# Patient Record
Sex: MALE | Race: BLACK OR AFRICAN AMERICAN | Employment: OTHER | URBAN - METROPOLITAN AREA
[De-identification: names, ages, dates, MRNs, and addresses within clinical notes are randomized per-mention and may not be internally consistent; named-entity substitution may affect disease eponyms.]

---

## 2017-05-03 PROBLEM — Z97.8 FOLEY CATHETER STATUS: Status: ACTIVE | Noted: 2017-05-03

## 2017-05-03 PROBLEM — R33.9 INCOMPLETE BLADDER EMPTYING: Status: ACTIVE | Noted: 2017-05-03

## 2017-05-03 PROBLEM — Z87.440 HISTORY OF UTI: Status: ACTIVE | Noted: 2017-05-03

## 2017-05-03 PROBLEM — N39.0 URINARY TRACT INFECTION WITHOUT HEMATURIA: Status: ACTIVE | Noted: 2017-05-03

## 2017-05-03 PROBLEM — R31.29 MICROSCOPIC HEMATURIA: Status: ACTIVE | Noted: 2017-05-03

## 2017-05-03 PROBLEM — N40.1 ENLARGED PROSTATE WITH LOWER URINARY TRACT SYMPTOMS (LUTS): Status: ACTIVE | Noted: 2017-05-03

## 2017-05-03 PROBLEM — R33.9 URINARY RETENTION: Status: ACTIVE | Noted: 2017-05-03

## 2017-05-03 PROBLEM — N13.30 HYDRONEPHROSIS OF RIGHT KIDNEY: Status: ACTIVE | Noted: 2017-05-03

## 2017-07-13 ENCOUNTER — APPOINTMENT (OUTPATIENT)
Dept: ULTRASOUND IMAGING | Age: 77
End: 2017-07-13
Attending: EMERGENCY MEDICINE
Payer: MEDICARE

## 2017-07-13 ENCOUNTER — HOSPITAL ENCOUNTER (EMERGENCY)
Age: 77
Discharge: HOME | End: 2017-07-13
Attending: EMERGENCY MEDICINE | Admitting: EMERGENCY MEDICINE
Payer: MEDICARE

## 2017-07-13 VITALS
HEART RATE: 81 BPM | BODY MASS INDEX: 31.07 KG/M2 | RESPIRATION RATE: 14 BRPM | OXYGEN SATURATION: 94 % | TEMPERATURE: 98.5 F | WEIGHT: 186.73 LBS | DIASTOLIC BLOOD PRESSURE: 69 MMHG | SYSTOLIC BLOOD PRESSURE: 113 MMHG

## 2017-07-13 DIAGNOSIS — N45.3 EPIDIDYMO-ORCHITIS: Primary | ICD-10-CM

## 2017-07-13 LAB
APPEARANCE UR: ABNORMAL
BACTERIA URNS QL MICRO: ABNORMAL /HPF
BILIRUB UR QL: NEGATIVE
COLOR UR: ABNORMAL
EPITH CASTS URNS QL MICRO: ABNORMAL /LPF
GLUCOSE UR STRIP.AUTO-MCNC: NEGATIVE MG/DL
HGB UR QL STRIP: ABNORMAL
KETONES UR QL STRIP.AUTO: NEGATIVE MG/DL
LEUKOCYTE ESTERASE UR QL STRIP.AUTO: ABNORMAL
NITRITE UR QL STRIP.AUTO: NEGATIVE
PH UR STRIP: 5.5 [PH] (ref 5–8)
PROT UR STRIP-MCNC: 30 MG/DL
RBC #/AREA URNS HPF: ABNORMAL /HPF (ref 0–5)
SP GR UR REFRACTOMETRY: 1.03 (ref 1–1.03)
UROBILINOGEN UR QL STRIP.AUTO: 0.2 EU/DL (ref 0.2–1)
WBC URNS QL MICRO: ABNORMAL /HPF (ref 0–4)

## 2017-07-13 PROCEDURE — 76870 US EXAM SCROTUM: CPT

## 2017-07-13 PROCEDURE — 74011250637 HC RX REV CODE- 250/637: Performed by: EMERGENCY MEDICINE

## 2017-07-13 PROCEDURE — 99283 EMERGENCY DEPT VISIT LOW MDM: CPT

## 2017-07-13 PROCEDURE — 81001 URINALYSIS AUTO W/SCOPE: CPT | Performed by: EMERGENCY MEDICINE

## 2017-07-13 RX ORDER — TRAMADOL HYDROCHLORIDE 50 MG/1
50 TABLET ORAL
Status: COMPLETED | OUTPATIENT
Start: 2017-07-13 | End: 2017-07-13

## 2017-07-13 RX ORDER — LEVOFLOXACIN 500 MG/1
500 TABLET, FILM COATED ORAL DAILY
Qty: 10 TAB | Refills: 0 | Status: SHIPPED | OUTPATIENT
Start: 2017-07-13 | End: 2017-07-18

## 2017-07-13 RX ORDER — HYDROCODONE BITARTRATE AND ACETAMINOPHEN 7.5; 325 MG/1; MG/1
1 TABLET ORAL
Status: COMPLETED | OUTPATIENT
Start: 2017-07-13 | End: 2017-07-13

## 2017-07-13 RX ORDER — HYDROCODONE BITARTRATE AND ACETAMINOPHEN 5; 325 MG/1; MG/1
1 TABLET ORAL
Qty: 20 TAB | Refills: 0 | Status: SHIPPED | OUTPATIENT
Start: 2017-07-13 | End: 2017-09-05

## 2017-07-13 RX ADMIN — HYDROCODONE BITARTRATE AND ACETAMINOPHEN 1 TABLET: 7.5; 325 TABLET ORAL at 17:54

## 2017-07-13 RX ADMIN — TRAMADOL HYDROCHLORIDE 50 MG: 50 TABLET, FILM COATED ORAL at 12:57

## 2017-07-13 NOTE — DISCHARGE INSTRUCTIONS
Epididymitis and Orchitis: Care Instructions  Your Care Instructions    Epididymitis is pain and swelling of the tube that is attached to each testicle. This tube is called the epididymis. Orchitis is pain and swelling of the testicle. Infection with bacteria often causes these conditions. Sexually transmitted infections (STIs) also can cause both conditions. This is often the case in men younger than 28. Other causes in boys and older men are infections from surgery or having a catheter that drains urine. The mumps virus also can cause orchitis. Anti-inflammatory or pain medicines can help with the pain. Antibiotics are used if the problem is caused by bacteria. They are not used if a virus is the cause. Your testicle may stay swollen for many days or even a few weeks. The doctor has checked you carefully, but problems can develop later. If you notice any problems or new symptoms, get medical treatment right away. Follow-up care is a key part of your treatment and safety. Be sure to make and go to all appointments, and call your doctor if you are having problems. It's also a good idea to know your test results and keep a list of the medicines you take. How can you care for yourself at home? · If your doctor prescribed antibiotics, take them as directed. Do not stop taking them just because you feel better. You need to take the full course of antibiotics. · Ask your doctor if you can take an over-the-counter pain medicine, such as acetaminophen (Tylenol), ibuprofen (Advil, Motrin), or naproxen (Aleve). Be safe with medicines. Read and follow all instructions on the label. · Limit your activity to what is comfortable. · Wear snug underwear or an athletic supporter. This can help reduce pain. · Apply either cold or heat to the swollen area. Use the one that works best for your pain. Sitting in a warm bath for 15 minutes twice a day will help reduce the swelling more quickly.   · If you have been told that an STI may have caused your condition, do not have sex until your doctor says it is safe. This will prevent spreading the infection. Tell your sex partner or partners that they need to be checked. They may need treatment. When should you call for help? Call your doctor now or seek immediate medical care if:  · Your pain gets worse. · You have a new or higher fever. · You have new or more swelling of your testicle. · You have new belly pain, or your pain gets worse. Watch closely for changes in your health, and be sure to contact your doctor if:  · You do not get better as expected. Where can you learn more? Go to http://bernarda-pereti.info/. Enter S360 in the search box to learn more about \"Epididymitis and Orchitis: Care Instructions. \"  Current as of: August 12, 2016  Content Version: 11.3  © 9201-1572 [employer]. Care instructions adapted under license by [employer] (which disclaims liability or warranty for this information). If you have questions about a medical condition or this instruction, always ask your healthcare professional. [address] any warranty or liability for your use of this information.

## 2017-07-13 NOTE — ED NOTES
Discharge paper work given to patient by MD patients questions and concerns answered. Patient discharged. Patient given scrotal sling.

## 2017-07-13 NOTE — ED NOTES
Bedside shift change report given to [place] (oncoming nurse) by Jeffrey Levi  (offgoing nurse). Report included the following information SBAR and ED Summary.

## 2017-07-13 NOTE — ED PROVIDER NOTES
HPI Comments: Willard Holm is a 78 y.o. male with pertinent PMHx of enlarged prostate who presents ambulatory to ED c/o constant, worsening, moderate swelling and pain of testicles for the past 2 days. Pt states he has been taking Extra Strength Tylenol with temporary relief of pain. Pt notes he has been evaluated by urology for enlarged prostate in the past. Pt denies any fevers, chills, difficulty urinating, penile discharge, or N/V/D.     PCP:  JAVY Chu    Social Hx:  tobacco use (+), EtOH use (-)    There are no other complaints, changes or physical findings at this time. The history is provided by the patient. History reviewed. No pertinent past medical history. History reviewed. No pertinent surgical history. History reviewed. No pertinent family history. Social History     Social History    Marital status:      Spouse name: N/A    Number of children: N/A    Years of education: N/A     Occupational History    Not on file. Social History Main Topics    Smoking status: Heavy Tobacco Smoker     Packs/day: 3.00    Smokeless tobacco: Never Used      Comment: urocancer risk discussed    Alcohol use No    Drug use: Not on file    Sexual activity: Not on file     Other Topics Concern    Not on file     Social History Narrative         ALLERGIES: Review of patient's allergies indicates no known allergies. Review of Systems   Constitutional: Negative. Negative for activity change, appetite change, chills, fatigue, fever and unexpected weight change. HENT: Negative. Negative for congestion, hearing loss, rhinorrhea, sneezing and voice change. Eyes: Negative. Negative for pain and visual disturbance. Respiratory: Negative. Negative for apnea, cough, choking, chest tightness and shortness of breath. Cardiovascular: Negative. Negative for chest pain and palpitations. Gastrointestinal: Negative.   Negative for abdominal distention, abdominal pain, blood in stool, diarrhea, nausea and vomiting. Genitourinary: Positive for scrotal swelling and testicular pain. Negative for difficulty urinating, discharge, flank pain, frequency and urgency. No discharge   Musculoskeletal: Negative. Negative for arthralgias, back pain, myalgias and neck stiffness. Skin: Negative. Negative for color change and rash. Neurological: Negative. Negative for dizziness, seizures, syncope, speech difficulty, weakness, numbness and headaches. Hematological: Negative for adenopathy. Psychiatric/Behavioral: Negative. Negative for agitation, behavioral problems, dysphoric mood and suicidal ideas. The patient is not nervous/anxious. Vitals:    07/13/17 1232 07/13/17 1400 07/13/17 1415   BP: 111/56 118/62 113/69   Pulse: 81     Resp: 14     Temp: 98.5 °F (36.9 °C)     SpO2: 94% 93% 94%   Weight: 84.7 kg (186 lb 11.7 oz)              Physical Exam   Constitutional: He is oriented to person, place, and time. He appears well-developed and well-nourished. No distress. HENT:   Head: Normocephalic and atraumatic. Mouth/Throat: Oropharynx is clear and moist. No oropharyngeal exudate. Eyes: Conjunctivae and EOM are normal. Pupils are equal, round, and reactive to light. Right eye exhibits no discharge. Left eye exhibits no discharge. Neck: Normal range of motion. Neck supple. Cardiovascular: Normal rate, regular rhythm and intact distal pulses. Exam reveals no gallop and no friction rub. No murmur heard. Pulmonary/Chest: Effort normal and breath sounds normal. No respiratory distress. He has no wheezes. He has no rales. He exhibits no tenderness. Abdominal: Soft. Bowel sounds are normal. He exhibits no distension and no mass. There is no tenderness. There is no rebound and no guarding. Genitourinary: Right testis shows swelling and tenderness. Left testis shows swelling and tenderness. Genitourinary Comments: Enlarged testicles with diffuse swelling. Musculoskeletal: Normal range of motion. He exhibits no edema. Lymphadenopathy:     He has no cervical adenopathy. Neurological: He is alert and oriented to person, place, and time. No cranial nerve deficit. Coordination normal.   Skin: Skin is warm and dry. No rash noted. No erythema. Psychiatric: He has a normal mood and affect. Nursing note and vitals reviewed. MDM  Number of Diagnoses or Management Options  Diagnosis management comments: DDx: Torsion, Hydrocele, orchitis        Amount and/or Complexity of Data Reviewed  Tests in the radiology section of CPT®: reviewed and ordered  Review and summarize past medical records: yes    Patient Progress  Patient progress: stable    ED Course       Procedures    Chief Complaint   Patient presents with    Testicle Pain     bilateral testicle pain and swelling x 2 days. 12:56 PM  The patients presenting problems have been discussed, and they are in agreement with the care plan formulated and outlined with them. I have encouraged them to ask questions as they arise throughout their visit. MEDICATIONS GIVEN:  Medications   HYDROcodone-acetaminophen (NORCO) 7.5-325 mg per tablet 1 Tab (not administered)   traMADol (ULTRAM) tablet 50 mg (50 mg Oral Given 7/13/17 1257)       LABS REVIEWED:  No results found for this or any previous visit (from the past 24 hour(s)). VITAL SIGNS:  Patient Vitals for the past 12 hrs:   Temp Pulse Resp BP SpO2   07/13/17 1415 - - - 113/69 94 %   07/13/17 1400 - - - 118/62 93 %   07/13/17 1232 98.5 °F (36.9 °C) 81 14 111/56 94 %       RADIOLOGY RESULTS:  The following have been ordered and reviewed:    EXAM:  US SCROTUM/TESTICLES      INDICATION: Pain and swelling for 3 days.     COMPARISON: None.     TECHNIQUE:  Real-time sonography of the scrotum was performed with a high frequency linear  transducer. Multiple static images were obtained.  Color Doppler evaluation was  also performed.     FINDINGS:  RIGHT TESTICLE: The right testicle is normal in size and echotexture with normal  color-flow. The right testis measures 3.8 x 2.8 x 2.2 cm and the right  testicular volume is 12.1 cc.      RIGHT EPIDIDYMIS: There is a small epididymal head cyst.      LEFT TESTICLE: The left testicle is normal in size and echotexture with  significantly increased flow. The left testis measures 3.7 x 3.0 x 2.8 cm and  the left testicular volume is 16.2 cc. There is a complex left hydrocele.     LEFT EPIDIDYMIS: There is hypervascularity of the epididymis.     There is significant thickening of the scrotum bilaterally. There is thickening  and inflammation of the left spermatic cord.     IMPRESSION  IMPRESSION:   1. Left epididymoorchitis with a thickened inflamed left spermatic cord and  complex left hydrocele. 2. Significant bilateral scrotal edema. PROGRESS NOTES:    4:18 PM  The patient states that their symptoms have resolved and they feel much better. There are no other new complaints at this time. His questions have been answered. We are awaiting final results and those will be reviewed with them when they become available. 5:46 PM   Patient resting in [facility]. Updated on results of [address]. Discussed discharge instructions including f/u instructions and return precautions. Addressed questions. Pt's US resulted with epididymo-orchitis. Patient denies sexual activity x years. Will tx with Levofloxacin and f/u with Dr. Wendy Shook, urology. DIAGNOSIS:    1.  Epididymo-orchitis        PLAN:  Follow-up Information     Follow up With Details Comments Contact Info    Yadi Rivera MD  Please follow up as soon as possible [place] [address]  [phone]      [facility]  As needed, If symptoms worsen [address] [place] [address]  [phone]        Current Discharge Medication List      START taking these medications    Details   levoFLOXacin (LEVAQUIN) 500 mg tablet Take 1 Tab by mouth daily for 10 days. Qty: 10 Tab, Refills: 0         CONTINUE these medications which have CHANGED    Details   HYDROcodone-acetaminophen (NORCO) 5-325 mg per tablet Take 1 Tab by mouth every six (6) hours as needed for Pain. Max Daily Amount: 4 Tabs. Qty: 20 Tab, Refills: 0               ED COURSE: The patients hospital course has been uncomplicated. SIGN OUT:  4:48 PM  Patient's presentation, labs/imaging and plan of care was reviewed with Patricia Mcfadden MD as part of sign out. They will review ultrasound results and disposition as part of the plan discussed with the patient. Patricia Mcfadden MD's assistance in completion of this plan is greatly appreciated but it should be noted that I will be the provider of record for this patient. David Torres MD    Attestations: This note is prepared by [employer], acting as Scribe for Jerry Cabezas. Ryne Torres MD.      The scribe's documentation has been prepared under my direction and personally reviewed by me in its entirety. I confirm that the note above accurately reflects all work, treatment, procedures, and medical decision making performed by me. David Torres MD

## 2017-07-13 NOTE — ED NOTES
Patient ambulatory to the ER c/o bilateral testicle swelling and pain x 2 days. Hx of enlarged prostate. Patient states this happened after lifting.

## 2017-07-16 PROBLEM — N39.0 RECURRENT UTI: Status: ACTIVE | Noted: 2017-07-16

## 2017-07-16 PROBLEM — N45.1 EPIDIDYMITIS, LEFT: Status: ACTIVE | Noted: 2017-07-16

## 2017-07-16 PROBLEM — N43.3 HYDROCELE OF SPERMATIC CORD, TESTIS, OR TUNICA VAGINALIS: Status: ACTIVE | Noted: 2017-07-16

## 2017-07-16 PROBLEM — N39.0 URINARY TRACT INFECTION WITHOUT HEMATURIA: Status: RESOLVED | Noted: 2017-05-03 | Resolved: 2017-07-16

## 2017-07-16 PROBLEM — Z97.8 FOLEY CATHETER STATUS: Status: RESOLVED | Noted: 2017-05-03 | Resolved: 2017-07-16

## 2017-08-15 PROBLEM — R39.15 URGENCY OF URINATION: Status: ACTIVE | Noted: 2017-08-15

## 2017-08-15 PROBLEM — N39.0 RECURRENT UTI: Status: RESOLVED | Noted: 2017-07-16 | Resolved: 2017-08-15

## 2017-08-15 PROBLEM — R33.9 URINARY RETENTION: Status: RESOLVED | Noted: 2017-05-03 | Resolved: 2017-08-15

## 2017-08-15 PROBLEM — Z87.898 HISTORY OF URINARY RETENTION: Status: ACTIVE | Noted: 2017-08-15

## 2017-08-15 PROBLEM — R35.1 NOCTURIA: Status: ACTIVE | Noted: 2017-08-15

## 2019-10-28 PROBLEM — N39.0 URINARY TRACT INFECTION WITHOUT HEMATURIA: Status: ACTIVE | Noted: 2019-10-28

## 2019-10-28 PROBLEM — N17.9 ACUTE RENAL FAILURE (HCC): Status: ACTIVE | Noted: 2019-10-28

## 2019-10-28 PROBLEM — R31.29 MICROSCOPIC HEMATURIA: Status: RESOLVED | Noted: 2017-05-03 | Resolved: 2019-10-28

## 2019-10-28 PROBLEM — R33.9 URINE RETENTION: Status: ACTIVE | Noted: 2019-10-28

## 2019-10-28 PROBLEM — Z87.898 HISTORY OF URINARY RETENTION: Status: RESOLVED | Noted: 2017-08-15 | Resolved: 2019-10-28

## 2019-10-28 PROBLEM — Z87.440 HISTORY OF UTI: Status: RESOLVED | Noted: 2017-05-03 | Resolved: 2019-10-28

## 2019-10-28 PROBLEM — N43.3 HYDROCELE OF SPERMATIC CORD, TESTIS, OR TUNICA VAGINALIS: Status: RESOLVED | Noted: 2017-07-16 | Resolved: 2019-10-28

## 2019-10-28 PROBLEM — Z97.8 FOLEY CATHETER IN PLACE: Status: ACTIVE | Noted: 2019-10-28

## 2019-10-28 PROBLEM — N45.1 EPIDIDYMITIS, LEFT: Status: RESOLVED | Noted: 2017-07-16 | Resolved: 2019-10-28

## 2019-10-31 PROBLEM — M25.519 ARTHRALGIA OF SHOULDER: Status: ACTIVE | Noted: 2019-10-31

## 2019-10-31 PROBLEM — E78.5 HYPERLIPIDEMIA: Status: ACTIVE | Noted: 2019-10-31

## 2019-10-31 PROBLEM — R73.01 IMPAIRED FASTING GLUCOSE: Status: ACTIVE | Noted: 2019-10-31

## 2019-10-31 PROBLEM — R20.2 PARESTHESIA OF HAND: Status: ACTIVE | Noted: 2019-10-31

## 2019-10-31 PROBLEM — N32.89 BLADDER SPASM: Status: ACTIVE | Noted: 2017-05-13

## 2019-10-31 PROBLEM — K63.5 COLON POLYPS: Status: ACTIVE | Noted: 2017-05-13

## 2019-10-31 PROBLEM — N40.1 LOWER URINARY TRACT SYMPTOMS DUE TO BENIGN PROSTATIC HYPERPLASIA: Status: ACTIVE | Noted: 2019-10-31

## 2019-10-31 PROBLEM — N19 RENAL FAILURE: Status: ACTIVE | Noted: 2019-10-15

## 2019-10-31 PROBLEM — H91.90 HEARING LOSS: Status: ACTIVE | Noted: 2019-10-31

## 2019-10-31 PROBLEM — F43.10 POSTTRAUMATIC STRESS DISORDER, DELAYED ONSET: Status: ACTIVE | Noted: 2019-10-31

## 2019-10-31 PROBLEM — E55.9 VITAMIN D DEFICIENCY: Status: ACTIVE | Noted: 2019-10-31

## 2019-10-31 PROBLEM — E11.8 TYPE 2 DIABETES MELLITUS WITH COMPLICATION (HCC): Status: ACTIVE | Noted: 2018-08-30

## 2019-10-31 PROBLEM — B35.4 TINEA CORPORIS: Status: ACTIVE | Noted: 2018-08-30

## 2019-10-31 PROBLEM — M54.50 CHRONIC LOW BACK PAIN: Status: ACTIVE | Noted: 2019-10-31

## 2019-10-31 PROBLEM — Z72.0 TOBACCO USER: Status: ACTIVE | Noted: 2017-05-13

## 2019-10-31 PROBLEM — Z91.199 NONCOMPLIANCE: Status: ACTIVE | Noted: 2017-05-13

## 2019-10-31 PROBLEM — M54.16 LUMBAR RADICULOPATHY: Status: ACTIVE | Noted: 2019-10-31

## 2019-10-31 PROBLEM — G89.29 CHRONIC LOW BACK PAIN: Status: ACTIVE | Noted: 2019-10-31

## 2019-10-31 PROBLEM — N40.0 BENIGN PROSTATIC HYPERPLASIA: Status: ACTIVE | Noted: 2017-05-03

## 2019-10-31 PROBLEM — H02.401 PTOSIS OF RIGHT EYELID: Status: ACTIVE | Noted: 2018-10-24

## 2019-10-31 PROBLEM — E66.9 OBESITY: Status: ACTIVE | Noted: 2019-10-31

## 2019-10-31 PROBLEM — M25.552 PAIN OF LEFT HIP JOINT: Status: ACTIVE | Noted: 2019-10-31

## 2019-12-15 PROBLEM — N19 RENAL FAILURE: Status: RESOLVED | Noted: 2019-10-15 | Resolved: 2019-12-15

## 2019-12-15 PROBLEM — N40.1 LOWER URINARY TRACT SYMPTOMS DUE TO BENIGN PROSTATIC HYPERPLASIA: Status: RESOLVED | Noted: 2019-10-31 | Resolved: 2019-12-15

## 2019-12-15 PROBLEM — N32.89 BLADDER SPASM: Status: RESOLVED | Noted: 2017-05-13 | Resolved: 2019-12-15

## 2020-03-09 NOTE — PERIOP NOTES
PAT - SURGICAL PRE-ADMISSION INSTRUCTIONS    NAME:  Brandon Taveras                                                          TODAY'S DATE:  3/9/2020    SURGERY DATE:  3/12/2020                                  SURGERY ARRIVAL TIME:   TBA    1. Do NOT eat or drink anything, including candy or gum, after MIDNIGHT on 03/11 , unless you have specific instructions from your Surgeon or Anesthesia Provider to do so. 2. No smoking on the day of surgery. 3. No alcohol 24 hours prior to the day of surgery. 4. No recreational drugs for one week prior to the day of surgery. 5. Leave all valuables, including money/purse, at home. 6. Remove all jewelry, nail polish, makeup (including mascara); no lotions, powders, deodorant, or perfume/cologne/after shave. 7. Glasses/Contact lenses and Dentures may be worn to the hospital.  They will be removed prior to surgery. 8. Call your doctor if symptoms of a cold or illness develop within 24 ours prior to surgery. 9. AN ADULT MUST DRIVE YOU HOME AFTER OUTPATIENT SURGERY. 10. If you are having an OUTPATIENT procedure, please make arrangements for a responsible adult to be with you for 24 hours after your surgery. 11. If you are admitted to the hospital, you will be assigned to a bed after surgery is complete. Normally a family member will not be able to see you until you are in your assigned bed. 15. Family is encouraged to accompany you to the hospital.  We ask visitors in the treatment area to be limited to ONE person at a time to ensure patient privacy. EXCEPTIONS WILL BE MADE AS NEEDED. 15. Children under 12 are discouraged from entering the treatment area and need to be supervised by an adult when in the waiting room. Special Instructions:    NONE. Patient Prep:    shower with anti-bacterial soap    These surgical instructions were reviewed with Johnny during the PAT phone call. Directions: On the morning of surgery, please go to the [facility]. Enter the building from the [facility] entrance, 1st floor (next to the Emergency Room entrance). Take the elevator to the 2nd floor. Sign in at the Registration Desk.     If you have any questions and/or concerns, please do not hesitate to call:  (Prior to the day of surgery)  [facility] unit:  [phone]  (Day of surgery)  [facility] unit:  [phone]

## 2020-03-11 ENCOUNTER — ANESTHESIA EVENT (OUTPATIENT)
Dept: SURGERY | Age: 80
End: 2020-03-11
Payer: MEDICARE

## 2020-03-12 ENCOUNTER — HOSPITAL ENCOUNTER (OUTPATIENT)
Age: 80
Setting detail: OUTPATIENT SURGERY
Discharge: HOME | End: 2020-03-12
Attending: UROLOGY | Admitting: UROLOGY
Payer: MEDICARE

## 2020-03-12 ENCOUNTER — ANESTHESIA (OUTPATIENT)
Dept: SURGERY | Age: 80
End: 2020-03-12
Payer: MEDICARE

## 2020-03-12 VITALS
DIASTOLIC BLOOD PRESSURE: 76 MMHG | SYSTOLIC BLOOD PRESSURE: 154 MMHG | RESPIRATION RATE: 17 BRPM | TEMPERATURE: 98 F | WEIGHT: 177.2 LBS | HEIGHT: 65 IN | BODY MASS INDEX: 29.52 KG/M2 | OXYGEN SATURATION: 95 % | HEART RATE: 75 BPM

## 2020-03-12 DIAGNOSIS — N40.1 BENIGN PROSTATIC HYPERPLASIA WITH LOWER URINARY TRACT SYMPTOMS, SYMPTOM DETAILS UNSPECIFIED: Primary | ICD-10-CM

## 2020-03-12 PROCEDURE — 74011250636 HC RX REV CODE- 250/636: Performed by: NURSE ANESTHETIST, CERTIFIED REGISTERED

## 2020-03-12 PROCEDURE — 74011250637 HC RX REV CODE- 250/637: Performed by: NURSE ANESTHETIST, CERTIFIED REGISTERED

## 2020-03-12 PROCEDURE — 74011000258 HC RX REV CODE- 258: Performed by: UROLOGY

## 2020-03-12 PROCEDURE — 77030020849 HC CATH URETH FOL 3 WAY  BARD -B: Performed by: UROLOGY

## 2020-03-12 PROCEDURE — 74011000250 HC RX REV CODE- 250: Performed by: NURSE ANESTHETIST, CERTIFIED REGISTERED

## 2020-03-12 PROCEDURE — 74011000250 HC RX REV CODE- 250: Performed by: UROLOGY

## 2020-03-12 PROCEDURE — 76060000036 HC ANESTHESIA 2.5 TO 3 HR: Performed by: UROLOGY

## 2020-03-12 PROCEDURE — 74011250636 HC RX REV CODE- 250/636: Performed by: UROLOGY

## 2020-03-12 PROCEDURE — 77030012961 HC IRR KT CYSTO/TUR ICUM -A: Performed by: UROLOGY

## 2020-03-12 PROCEDURE — 77030005546 HC CATH URETH FOL 3W BARD -A: Performed by: UROLOGY

## 2020-03-12 PROCEDURE — 77030012884 HC SLV COMPR SCD MTEK -B: Performed by: UROLOGY

## 2020-03-12 PROCEDURE — 74011000258 HC RX REV CODE- 258: Performed by: NURSE ANESTHETIST, CERTIFIED REGISTERED

## 2020-03-12 PROCEDURE — 76210000022 HC REC RM PH II 1.5 TO 2 HR: Performed by: UROLOGY

## 2020-03-12 PROCEDURE — 76210000019 HC OR PH I REC 4 TO 4.5 HR: Performed by: UROLOGY

## 2020-03-12 PROCEDURE — 74011000272 HC RX REV CODE- 272: Performed by: UROLOGY

## 2020-03-12 PROCEDURE — 77030018846 HC SOL IRR STRL H20 ICUM -A: Performed by: UROLOGY

## 2020-03-12 PROCEDURE — 88305 TISSUE EXAM BY PATHOLOGIST: CPT

## 2020-03-12 PROCEDURE — 74011000250 HC RX REV CODE- 250: Performed by: ANESTHESIOLOGY

## 2020-03-12 PROCEDURE — 77030015791 HC CATH FOL DRN LXF BARD -A: Performed by: UROLOGY

## 2020-03-12 PROCEDURE — 76010000172 HC OR TIME 2.5 TO 3 HR INTENSV-TIER 1: Performed by: UROLOGY

## 2020-03-12 PROCEDURE — 77030018836 HC SOL IRR NACL ICUM -A: Performed by: UROLOGY

## 2020-03-12 RX ORDER — LIDOCAINE HYDROCHLORIDE 10 MG/ML
0.1 INJECTION, SOLUTION EPIDURAL; INFILTRATION; INTRACAUDAL; PERINEURAL AS NEEDED
Status: DISCONTINUED | OUTPATIENT
Start: 2020-03-12 | End: 2020-03-12 | Stop reason: HOSPADM

## 2020-03-12 RX ORDER — FAMOTIDINE 20 MG/1
20 TABLET, FILM COATED ORAL ONCE
Status: COMPLETED | OUTPATIENT
Start: 2020-03-12 | End: 2020-03-12

## 2020-03-12 RX ORDER — IPRATROPIUM BROMIDE AND ALBUTEROL SULFATE 2.5; .5 MG/3ML; MG/3ML
3 SOLUTION RESPIRATORY (INHALATION)
Status: COMPLETED | OUTPATIENT
Start: 2020-03-12 | End: 2020-03-12

## 2020-03-12 RX ORDER — LIDOCAINE HYDROCHLORIDE 20 MG/ML
INJECTION, SOLUTION EPIDURAL; INFILTRATION; INTRACAUDAL; PERINEURAL AS NEEDED
Status: DISCONTINUED | OUTPATIENT
Start: 2020-03-12 | End: 2020-03-12 | Stop reason: HOSPADM

## 2020-03-12 RX ORDER — EPHEDRINE SULFATE/0.9% NACL/PF 50 MG/5 ML
SYRINGE (ML) INTRAVENOUS AS NEEDED
Status: DISCONTINUED | OUTPATIENT
Start: 2020-03-12 | End: 2020-03-12 | Stop reason: HOSPADM

## 2020-03-12 RX ORDER — ONDANSETRON 2 MG/ML
4 INJECTION INTRAMUSCULAR; INTRAVENOUS ONCE
Status: DISCONTINUED | OUTPATIENT
Start: 2020-03-12 | End: 2020-03-12 | Stop reason: HOSPADM

## 2020-03-12 RX ORDER — PROPOFOL 10 MG/ML
INJECTION, EMULSION INTRAVENOUS AS NEEDED
Status: DISCONTINUED | OUTPATIENT
Start: 2020-03-12 | End: 2020-03-12 | Stop reason: HOSPADM

## 2020-03-12 RX ORDER — ROCURONIUM BROMIDE 10 MG/ML
INJECTION, SOLUTION INTRAVENOUS AS NEEDED
Status: DISCONTINUED | OUTPATIENT
Start: 2020-03-12 | End: 2020-03-12 | Stop reason: HOSPADM

## 2020-03-12 RX ORDER — MAGNESIUM SULFATE 100 %
4 CRYSTALS MISCELLANEOUS AS NEEDED
Status: DISCONTINUED | OUTPATIENT
Start: 2020-03-12 | End: 2020-03-12 | Stop reason: HOSPADM

## 2020-03-12 RX ORDER — FENTANYL CITRATE 50 UG/ML
INJECTION, SOLUTION INTRAMUSCULAR; INTRAVENOUS AS NEEDED
Status: DISCONTINUED | OUTPATIENT
Start: 2020-03-12 | End: 2020-03-12 | Stop reason: HOSPADM

## 2020-03-12 RX ORDER — DEXAMETHASONE SODIUM PHOSPHATE 4 MG/ML
INJECTION, SOLUTION INTRA-ARTICULAR; INTRALESIONAL; INTRAMUSCULAR; INTRAVENOUS; SOFT TISSUE AS NEEDED
Status: DISCONTINUED | OUTPATIENT
Start: 2020-03-12 | End: 2020-03-12 | Stop reason: HOSPADM

## 2020-03-12 RX ORDER — FENTANYL CITRATE 50 UG/ML
50 INJECTION, SOLUTION INTRAMUSCULAR; INTRAVENOUS AS NEEDED
Status: DISCONTINUED | OUTPATIENT
Start: 2020-03-12 | End: 2020-03-12 | Stop reason: HOSPADM

## 2020-03-12 RX ORDER — ONDANSETRON 2 MG/ML
INJECTION INTRAMUSCULAR; INTRAVENOUS AS NEEDED
Status: DISCONTINUED | OUTPATIENT
Start: 2020-03-12 | End: 2020-03-12 | Stop reason: HOSPADM

## 2020-03-12 RX ORDER — DOCUSATE SODIUM 100 MG/1
100 CAPSULE, LIQUID FILLED ORAL
Qty: 30 CAP | Refills: 0 | Status: SHIPPED | OUTPATIENT
Start: 2020-03-12 | End: 2020-04-11

## 2020-03-12 RX ORDER — TRAMADOL HYDROCHLORIDE 50 MG/1
50 TABLET ORAL
Qty: 12 TAB | Refills: 0 | Status: SHIPPED | OUTPATIENT
Start: 2020-03-12 | End: 2020-03-15

## 2020-03-12 RX ORDER — SODIUM CHLORIDE 0.9 % (FLUSH) 0.9 %
5-40 SYRINGE (ML) INJECTION AS NEEDED
Status: DISCONTINUED | OUTPATIENT
Start: 2020-03-12 | End: 2020-03-12 | Stop reason: HOSPADM

## 2020-03-12 RX ORDER — HYDROMORPHONE HYDROCHLORIDE 2 MG/ML
0.5 INJECTION, SOLUTION INTRAMUSCULAR; INTRAVENOUS; SUBCUTANEOUS
Status: DISCONTINUED | OUTPATIENT
Start: 2020-03-12 | End: 2020-03-12 | Stop reason: HOSPADM

## 2020-03-12 RX ORDER — DEXTROSE MONOHYDRATE 100 MG/ML
125-250 INJECTION, SOLUTION INTRAVENOUS AS NEEDED
Status: DISCONTINUED | OUTPATIENT
Start: 2020-03-12 | End: 2020-03-12 | Stop reason: HOSPADM

## 2020-03-12 RX ORDER — SODIUM CHLORIDE 0.9 % (FLUSH) 0.9 %
5-40 SYRINGE (ML) INJECTION EVERY 8 HOURS
Status: DISCONTINUED | OUTPATIENT
Start: 2020-03-12 | End: 2020-03-12 | Stop reason: HOSPADM

## 2020-03-12 RX ORDER — SODIUM CHLORIDE, SODIUM LACTATE, POTASSIUM CHLORIDE, CALCIUM CHLORIDE 600; 310; 30; 20 MG/100ML; MG/100ML; MG/100ML; MG/100ML
50 INJECTION, SOLUTION INTRAVENOUS CONTINUOUS
Status: DISCONTINUED | OUTPATIENT
Start: 2020-03-12 | End: 2020-03-12 | Stop reason: HOSPADM

## 2020-03-12 RX ORDER — SUCCINYLCHOLINE CHLORIDE 100 MG/5ML
SYRINGE (ML) INTRAVENOUS AS NEEDED
Status: DISCONTINUED | OUTPATIENT
Start: 2020-03-12 | End: 2020-03-12 | Stop reason: HOSPADM

## 2020-03-12 RX ADMIN — FENTANYL CITRATE 50 MCG: 50 INJECTION, SOLUTION INTRAMUSCULAR; INTRAVENOUS at 09:16

## 2020-03-12 RX ADMIN — IPRATROPIUM BROMIDE AND ALBUTEROL SULFATE 3 ML: .5; 3 SOLUTION RESPIRATORY (INHALATION) at 08:08

## 2020-03-12 RX ADMIN — SUGAMMADEX 200 MG: 100 INJECTION, SOLUTION INTRAVENOUS at 11:17

## 2020-03-12 RX ADMIN — FENTANYL CITRATE 50 MCG: 50 INJECTION, SOLUTION INTRAMUSCULAR; INTRAVENOUS at 08:44

## 2020-03-12 RX ADMIN — ONDANSETRON 4 MG: 2 SOLUTION INTRAMUSCULAR; INTRAVENOUS at 11:17

## 2020-03-12 RX ADMIN — FAMOTIDINE 20 MG: 20 TABLET ORAL at 07:36

## 2020-03-12 RX ADMIN — ROCURONIUM BROMIDE 35 MG: 10 SOLUTION INTRAVENOUS at 09:04

## 2020-03-12 RX ADMIN — Medication 100 MG: at 08:53

## 2020-03-12 RX ADMIN — PHENYLEPHRINE HYDROCHLORIDE 100 MCG: 10 INJECTION INTRAVENOUS at 09:06

## 2020-03-12 RX ADMIN — PHENYLEPHRINE HYDROCHLORIDE 100 MCG: 10 INJECTION INTRAVENOUS at 09:22

## 2020-03-12 RX ADMIN — SODIUM CHLORIDE, SODIUM LACTATE, POTASSIUM CHLORIDE, AND CALCIUM CHLORIDE 50 ML/HR: 600; 310; 30; 20 INJECTION, SOLUTION INTRAVENOUS at 07:43

## 2020-03-12 RX ADMIN — ROCURONIUM BROMIDE 10 MG: 10 SOLUTION INTRAVENOUS at 10:06

## 2020-03-12 RX ADMIN — DEXAMETHASONE SODIUM PHOSPHATE 4 MG: 4 INJECTION, SOLUTION INTRA-ARTICULAR; INTRALESIONAL; INTRAMUSCULAR; INTRAVENOUS; SOFT TISSUE at 08:59

## 2020-03-12 RX ADMIN — SODIUM CHLORIDE, SODIUM LACTATE, POTASSIUM CHLORIDE, AND CALCIUM CHLORIDE: 600; 310; 30; 20 INJECTION, SOLUTION INTRAVENOUS at 10:00

## 2020-03-12 RX ADMIN — Medication 10 MG: at 09:06

## 2020-03-12 RX ADMIN — ROCURONIUM BROMIDE 5 MG: 10 SOLUTION INTRAVENOUS at 08:53

## 2020-03-12 RX ADMIN — PROPOFOL 150 MG: 10 INJECTION, EMULSION INTRAVENOUS at 08:53

## 2020-03-12 RX ADMIN — FENTANYL CITRATE 50 MCG: 50 INJECTION, SOLUTION INTRAMUSCULAR; INTRAVENOUS at 08:53

## 2020-03-12 RX ADMIN — FENTANYL CITRATE 50 MCG: 50 INJECTION, SOLUTION INTRAMUSCULAR; INTRAVENOUS at 09:43

## 2020-03-12 RX ADMIN — GENTAMICIN SULFATE 348.4 MG: 40 INJECTION, SOLUTION INTRAMUSCULAR; INTRAVENOUS at 08:57

## 2020-03-12 RX ADMIN — LIDOCAINE HYDROCHLORIDE 80 MG: 20 INJECTION, SOLUTION INTRAVENOUS at 08:53

## 2020-03-12 NOTE — PERIOP NOTES
[phone] Patient arrived to PACU. Assumed care. Connected to monitor. VSS. Will continue to monitor    1345 Dr. Hercules at bedside to evaluate patient. Re-adjusted catheter and orders to continue bladder irrigation for another bag. Will continue to monitor    1440 Anesthesia called to come evaluate patient. Patient states he feels like his throat is swollen or something is in it. Lung sounds are clear, respiratory rate normal, saturations good. When patient attempts to drink, he has difficulty swallowing it. .. Dr. Hercules aware. States to hold and observe patient longer. [phone] patient moved into the phase two of recovery. Waiting for Dr. Hercules to see patient one more time and give an official discharge order. [phone] patient discharged at this time. All instructions given to patient and patient's wife. No questions at this time. Both state an understanding to follow up with Dr. Hercules should questions arise.

## 2020-03-12 NOTE — DISCHARGE INSTRUCTIONS
Patient Education   Patient Education        Prostate Biopsy: About This Test  What is it? A prostate biopsy is a type of test. Your doctor takes about 10 to 12 small tissue samples from your prostate. Then another doctor looks at the tissue under a microscope to see if there are cancer cells. Why is this test done? You may need a prostate biopsy if your doctor found something of concern in your lab work or during your exam. A biopsy can help find out if you have prostate cancer. It may also be done for other reasons, such as monitoring the growth of prostate cancer for men on active surveillance. How do you prepare for the test?  Before you have a prostate biopsy, tell your doctor if you are taking any medicines or using any herbal supplements, or if you are allergic to any medicines. And tell your doctor if you have had bleeding problems, or you take aspirin or some other blood thinner. How is the test done? Some men have a prostate imaging test, such as an MRI or a CT scan, before their biopsy. The test results are used during the biopsy to select the areas of the prostate to sample. Before your biopsy, you may be given antibiotics to prevent infection. You may be asked to take off all of your clothes and put on a hospital gown. You may be given a sedative through a vein (IV) in your arm. The sedative will help you relax and stay still. If you have a general anesthetic, you will be in a recovery room for a few hours after the biopsy. Through the rectum  · You may be asked to kneel, lie on your side, or lie on your back. · Your doctor may inject an anesthetic around and into the prostate to numb the area before samples are taken. · An ultrasound probe will be gently inserted into your rectum. · A thin tool with a spring-loaded needle will be inserted next to the ultrasound probe. The ultrasound helps to locate the areas on the prostate where the samples will be taken.  If you had an MRI or CT scan, the test results will also be used to guide the sampling. · The needle enters the prostate and removes a sample. About 10 to 12 samples are usually taken. Through the perineum  · You will lie on an exam table either on your side or on your back with your knees bent. You will get anesthesia. The anesthesia may make you sleep. Or it may just numb the area being worked on.  · Your doctor will make a small cut in your perineum. Then he or she will collect samples from the prostate through the cut with a special tool. · An ultrasound probe inserted into the rectum may be used to help find the locations in the prostate where samples need to be taken. Sometimes other imaging, such as MRI, is used instead of or along with ultrasound. Or the test results from other imaging, such as an MRI or a CT scan, may be used to guide the sampling. How long will the test take? This test will take from 15 to 45 minutes, depending on how it's done. How is the test done? You will probably be able to go home right away, and you may feel tired the rest of the day. Your muscles may ache. Don't do heavy work or exercise for 4 hours after the test.  You may have mild pain in your pelvic area and blood in your urine for up to 5 days. You may have some blood in your semen for a week or longer. You may also have a change in color of your semen for up to 1 month after the biopsy. If the prostate samples are taken through the rectal wall (transrectal biopsy), you may have a small amount of bleeding from your rectum for 2 to 3 days after the biopsy. Follow-up care is a key part of your treatment and safety. Be sure to make and go to all appointments, and call your doctor if you are having problems. It's also a good idea to keep a list of the medicines you take. Ask your doctor when you can expect to have your test results. Where can you learn more?   Go to http://bernarda-preeti.info/  Enter Y504 in the search box to learn more about \"Prostate Biopsy: About This Test.\"  Current as of: August 21, 2019Content Version: 12.4  © 7466-3536 [employer]. Care instructions adapted under license by [employer] (which disclaims liability or warranty for this information). If you have questions about a medical condition or this instruction, always ask your healthcare professional. [address] any warranty or liability for your use of this information. Cystoscopy: What to Expect at [facility]    A cystoscopy is a procedure that lets a doctor look inside of the bladder and the urethra. The urethra is the tube that carries urine from the bladder to outside the body. The doctor uses a thin, lighted tool called a cystoscope. Your bladder is filled with fluid. This stretches the bladder so that your doctor can look closely at the inside of your bladder. After the cystoscopy, your urethra may be sore at first, and it may burn when you urinate for the first few days after the procedure. You may feel the need to urinate more often, and your urine may be pink. These symptoms should get better in 1 or 2 days. You will probably be able to go back to most of your usual activities in 1 or 2 days. This care sheet gives you a general idea about how long it will take for you to recover. But each person recovers at a different pace. Follow the steps below to get better as quickly as possible. How can you care for yourself at home? Activity    · Rest when you feel tired. Getting enough sleep will help you recover.     · Try to walk each day. Start by walking a little more than you did the day before. Bit by bit, increase the amount you walk. Walking boosts blood flow and helps prevent pneumonia and constipation.     · Avoid strenuous activities, such as bicycle riding, jogging, weight lifting, or aerobic exercise, until your doctor says it is okay.     · Ask your doctor when you can drive again.   · Most people are able to return to work within 1 or 2 days after the procedure.     · You may shower and take baths as usual.     · Ask your doctor when it is okay for you to have sex. Diet    · You can eat your normal diet. If your stomach is upset, try bland, low-fat foods like plain rice, broiled chicken, toast, and yogurt.     · Drink plenty of fluids (unless your doctor tells you not to). Medicines    · Take pain medicines exactly as directed. ? If the doctor gave you a prescription medicine for pain, take it as prescribed. ? If you are not taking a prescription pain medicine, ask your doctor if you can take an over-the-counter medicine.     · If you think your pain medicine is making you sick to your stomach:  ? Take your medicine after meals (unless your doctor has told you not to). ? Ask your doctor for a different pain medicine.     · If your doctor prescribed antibiotics, take them as directed. Do not stop taking them just because you feel better. You need to take the full course of antibiotics. Follow-up care is a key part of your treatment and safety. Be sure to make and go to all appointments, and call your doctor if you are having problems. It's also a good idea to know your test results and keep a list of the medicines you take. When should you call for help? Call 911 anytime you think you may need emergency care.  For example, call if:    · You passed out (lost consciousness).     · You have severe trouble breathing.     · You have sudden chest pain and shortness of breath, or you cough up blood.     · You have severe belly pain.    Call your doctor now or seek immediate medical care if:    · You are sick to your stomach or cannot keep fluids down.     · Your urine is still red or you see blood clots after you have urinated several times.     · You have trouble passing urine or stool, especially if you have pain or swelling in your lower belly.     · You have signs of a blood clot, such as:  ? Pain in your calf, back of the knee, thigh, or groin. ? Redness and swelling in your leg or groin.     · You develop a fever or severe chills.     · You have pain in your back just below your rib cage. This is called flank pain.    Watch closely for changes in your health, and be sure to contact your doctor if:    · You have pain or burning when you urinate. A burning feeling is normal for a day or two after the test, but call if it does not get better.     · You have a frequent urge to urinate but can pass only small amounts of urine.     · Your urine is pink, red, or cloudy, or smells bad. It is normal for the urine to have a pinkish color for a few days after the test, but call if it does not get better. Where can you learn more? Go to http://bernarda-preeti.info/  Enter C842 in the search box to learn more about \"Cystoscopy: What to Expect at Home. \"  Current as of: August 21, 2019Content Version: 12.4  © 0815-6036 [employer]. Care instructions adapted under license by [employer] (which disclaims liability or warranty for this information). If you have questions about a medical condition or this instruction, always ask your healthcare professional. [address] any warranty or liability for your use of this information.

## 2020-03-12 NOTE — PERIOP NOTES
Called the waiting area to update family on the status of patient's procedure. Natasha Valencia stated family did not respond when notified that update was available.

## 2020-03-12 NOTE — H&P
Niesha Richardson   1940  70 y.o.   2020     UROLOGY ESTABLISHED PATIENT           Encounter Diagnoses       ICD-10-CM ICD-9-CM   1. Urine retention R33.9 788.20   2. Benign prostatic hyperplasia with lower urinary tract symptoms, symptom details unspecified N40.1 600.01         Assessment:   1. 70 y.o. male with BPH with urinary retention. Enlarged prostate on CT A/P WO Cont 10/15/19. Ordonez placed 10/2019 with 1100cc output. Not interested in CIC. Ordonez exchanged today 2020. BPH Screen: cysto today with severe trilobar hypertrophy. TRUS vol 148.89cc. Scheduled for ProTouch LEP on 3/12/2020.     2. PSA screening: PSA 3.62 ng/mL on 19.     3. Bladder spasms  ordonez catheter. Started on Ditropan at last visit.      Plan:    · Urine sent for culture, will treat accordingly   · Ordonez catheter exchanged today  · Cysto today - severe trilobar hypertrophy. No evidence of bladder lesion. · TRUS vol 148. 89cc  · Continue Ditropan XL  · Discussed R/B of ProTouch LEP in detail. Patient wishes to proceed. Will need medical clearance. · To OR for ProTouch Laser Enucleation of the Prostate as scheduled on 3/12/2020        Discussion:  Risks and benefits of ProTouch Laser Enucleation of the prostate were reviewed in detail including infection, bleeding, blood transfusion, injury to bladder/urethra/surrounding structures, erectile dysfunction, urinary incontinence, bladder neck contracture, persistent obstructive and irritative voiding symptoms, and global anesthesia risks including but not limited to CVA, MI, DVT, PE, pneumonia, and death. Ky Marisela expressed understanding and desire to proceed.     Chief complaint:      Chief Complaint   Patient presents with    Benign Prostatic Hypertrophy    Urinary Retention         History of Present Illness:  Niesha Richardson is a 70 y.o. male who presents today for a BPH Screen for hx BPH w/LUTS.      Patient had a ordonez catheter placed in 10/2019 for urinary retention with output >1000cc. Patient has been having the catheter exchanged monthly and is interested in an outlet procedure. Patient is not interested in CIC. Flores exchanged today.     BPH Screen: Cysto today with severe trilobar hypertrophy. TRUS vol 148.89cc.     Patient has no complaints today. Denies any trouble with his Flores catheter.      Hx of appendectomy.      PSA 3.62 ng/mL on 2/22/19.     AUA SS catheter  VICKIE not recorded        Past Medical History  Past Medical History:   Diagnosis Date    Testicle swelling           Past Surgical History  History reviewed. No pertinent surgical history.     Family History  History reviewed.  No pertinent family history.     Social History  Social History            Socioeconomic History    Marital status:        Spouse name: Not on file    Number of children: Not on file    Years of education: Not on file    Highest education level: Not on file   Occupational History    Not on file   Social Needs    Financial resource strain: Not on file    Food insecurity:       Worry: Not on file       Inability: Not on file    Transportation needs:       Medical: Not on file       Non-medical: Not on file   Tobacco Use    Smoking status: Heavy Tobacco Smoker       Packs/day: 3.00    Smokeless tobacco: Never Used    Tobacco comment: urocancer risk discussed   Substance and Sexual Activity    Alcohol use: No    Drug use: Not on file    Sexual activity: Not on file   Lifestyle    Physical activity:       Days per week: Not on file       Minutes per session: Not on file    Stress: Not on file   Relationships    Social connections:       Talks on phone: Not on file       Gets together: Not on file       Attends Oriental orthodox service: Not on file       Active member of club or organization: Not on file       Attends meetings of clubs or organizations: Not on file       Relationship status: Not on file    Intimate partner violence:     Fear of current or ex partner: Not on file       Emotionally abused: Not on file       Physically abused: Not on file       Forced sexual activity: Not on file   Other Topics Concern    Not on file   Social History Narrative    Not on file         No Known Allergies  Current Outpatient Medications   Medication Sig    cefTRIAXone (ROCEPHIN) 1 gram injection 1 g by IntraMUSCular route once for 1 dose.  sertraline (ZOLOFT) 100 mg tablet Take  by mouth daily.  oxybutynin chloride XL (DITROPAN XL) 10 mg CR tablet Take 1 Tab by mouth daily.  acetic acid 0.25 % irrigation Instill 60cc of solution into the bladder and irrigate daily.  tamsulosin (FLOMAX) 0.4 mg capsule Take 1 Cap by mouth daily.  acetaminophen (TYLENOL) 325 mg tablet Take  by mouth every four (4) hours as needed for Pain.      No current facility-administered medications for this visit.          Review of Systems  Constitutional: Fever: No  Skin: Rash: No  HEENT: Hearing difficulty: No  Eyes: Blurred vision: No  Cardiovascular: Chest pain: No  Respiratory: Shortness of breath: No  Gastrointestinal: Nausea/vomiting: No  Musculoskeletal: Back pain: No  Neurological: Weakness: No  Psychological: Memory loss: No  Comments/additional findings:         Physical Exam:   Visit Vitals  /82   Ht 5' 5\" (1.651 m)   Wt 185 lb (83.9 kg)   BMI 30.79 kg/m²      Constitutional: WDWN, Pleasant and appropriate affect, No acute distress. CV:  No peripheral swelling noted  Respiratory: No respiratory distress or difficulties  Skin: No jaundice. Neuro/Psych:  Alert and oriented x 3, affect appropriate.    Lymphatic:   No enlarged supraclavicular lymph nodes.          Labs reviewed today:         Results for orders placed or performed in visit on 01/17/20   CULTURE, URINE   Result Value Ref Range     Urine Culture, Routine           Mixed urogenital blair  Greater than 100,000 colony forming units per mL            Radiology:      CT A/P WO Cont 10/15/19  IMPRESSION:     1.  Moderate right hydronephrosis and mild to moderate left hydronephrosis. 2.  Nonobstructing right nephrolithiasis with no stones within both ureters. 3.  Enlarged prostate gland with Flores catheter within the urinary bladder. 4.  Area of increased attenuation between the pancreatic tail, superior pole of the left kidney   and the spleen.  Cannot exclude focal pancreatitis of the tail portion of the pancreas in the   proper clinical setting.            A copy of today's office visit with all pertinent imaging results and labs were sent to the referring physician. CC: Sheryn Closs, MD Winnie Sick, MD  [facility]  [address] [facility] [address] [place] [address]  Phone: [phone]  Pager: [phone]     Patient's BMI is out of the normal parameters. Information about BMI was given and patient was advised to follow-up with their PCP for further management.     Medical documentation entered into the chart by Darryle Florence, medical scribe for Chasity Rahman MD on 2/28/2020.     History and physical review. The patient has been examined. There have been no significant clinical changes.     NAD, CTAB, RRR    NA Side marked  [facility] call to [address] [place] [address] to proceed with cysto, protouch LEP    Chasity Rahman MD

## 2020-03-12 NOTE — PERIOP NOTES
Lunch relief. VSS. Dozing comfortably. No distress or pain noted. Continuous irrigation infusing in 3 way ordonez. Blood tinged urine noted at fast rate. Dr Lenora Iqbal aware. Will monitor closely.

## 2020-03-12 NOTE — PROCEDURES
DATE  3/12/2020     PREPROCEDURE DIAGNOSES:  1.  Bladder outlet obstruction. 2.  Benign prostatic hyperplasia. POSTPROCEDURE DIAGNOSES:  SINCERE     PROCEDURES PERFORMED:  1. Cystoscopy. 2.  ProTouch laser enucleation of the prostate with tissue morcellation. 3. Urinary Retention     SURGEON:  Amanda Crowell MD     ASSISTANT:  None. ANESTHESIA:  General.     FLUIDS:  Crystalloid. ESTIMATED BLOOD LOSS:  Minimal.     SPECIMENS:  Prostate chips. DRAINS:  A 22-Lao 3-way Flores catheter. INTRAOPERATIVE FINDINGS:  1. No bladder lesions. 2.  Ureteral orifices in orthotopic position. 3.  Plus 3 + trabeculation. INDICATION FOR PROCEDURE:  The patient is a 72-year-old male with a history of bladder outlet obstruction and benign prostatic hyperplasia. Risks, benefits and alternatives were explained and the patient decided to proceed. DETAILS OF THE PROCEDURE:  The patient was first identified in the holding area and taken back to the operating room. Perioperative antibiotics were given and sequential compression devices placed. Anesthesia was induced. The patient placed in dorsal lithotomy position taking care to pad all pressure points. The patient was prepped and draped in the usual sterile fashion. Timeout was performed. First, a 22-Lao rigid cystoscope was inserted into the patient's bladder. Systematic cystoscopy was performed. Next, the scope was removed and the patient's urethra was calibrated to 30-Lao with sequential [employer] sounds. Next, the 24-Lao continuous flow resectoscope was inserted into the patient's bladder. We used the visualizing obturator. This was then exchanged for the laser bridge. Using a 550 micron Diode laser fiber, we first made an incision starting at the bladder neck at 5 and 7 o'clock and taking these down to the level of the verumontanum.   We then connected these incisions in front of the verumontanum and enucleated the median lobe in a retrograde fashion. We then extended our 5 o'clock incision around the apex of the prostate on the left side. We then made a 1 o'clock incision at the bladder neck and carried this around the apex of the prostate to meet our 5 o'clock incision. We then enucleated the left lateral lobe in a retrograde fashion. Next, we extended our 7 o'clock incision around the apex of the prostate on the right side. We then made an 11 o'clock incision at the bladder neck, carrying this around the apex of the prostate and connecting it with our 7 o'clock incision. We then enucleated the right lateral lobe in a retrograde fashion. Once all 3 lobes were in the bladder, we assured hemostasis. We then exchanged the continuous flow resectoscope for the offset nephroscope with the tissue morcellator and morcellated all chips. We then reinserted the continuous flow resectoscope and used the laser to ensure hemostasis. Once this was done, the scope was removed and a 22-Greek 3-way Flores catheter was placed, 30 mL sterile water in the balloon. The case came to a conclusion.     Noemi Blackburn MD

## 2020-03-12 NOTE — ANESTHESIA POSTPROCEDURE EVALUATION
Procedure(s):  Cystoscopy, Protouch laser  ENUCLEATION OF The PROSTATE with tissue morcellation. No value filed. Anesthesia Post Evaluation      Multimodal analgesia: multimodal analgesia used between 6 hours prior to anesthesia start to PACU discharge  Patient location during evaluation: bedside  Patient participation: complete - patient participated  Level of consciousness: awake  Pain management: adequate  Airway patency: patent  Anesthetic complications: no  Cardiovascular status: stable  Respiratory status: acceptable  Hydration status: acceptable  Post anesthesia nausea and vomiting:  controlled      Vitals Value Taken Time   /74 3/12/2020  1:56 PM   Temp 36.7 °C (98 °F) 3/12/2020 11:43 AM   Pulse 72 3/12/2020  2:08 PM   Resp 14 3/12/2020  2:08 PM   SpO2 97 % 3/12/2020  2:08 PM   Vitals shown include unvalidated device data.

## 2020-03-12 NOTE — ANESTHESIA PREPROCEDURE EVALUATION
Relevant Problems   No relevant active problems       Anesthetic History               Review of Systems / Medical History  Patient summary reviewed, nursing notes reviewed and pertinent labs reviewed    Pulmonary          Smoker (ACTIVE, 2 PPD)         Neuro/Psych         Psychiatric history     Cardiovascular              Hyperlipidemia         GI/Hepatic/Renal                Endo/Other    Diabetes: type 2    Morbid obesity     Other Findings   Comments: Enlarged prostate with lower urinary tract symptoms (LUTS)  Hydronephrosis of right kidney associated with large prostate and retention  Incomplete bladder emptying  Nocturia  Urgency of urination  Flores catheter in place  Urine retention  Urinary tract infection without hematuria  Abnormal EKG  Arthralgia of shoulder  Chronic low back pain  Colon polyps  Hearing loss  Hyperlipidemia  Impaired fasting glucose  Lumbar radiculopathy  Noncompliance  Obesity  Paresthesia of hand  Pain of left hip joint  Posttraumatic stress disorder, delayed onset  Ptosis of right eyelid  Tinea corporis  Tobacco user  Type 2 diabetes mellitus with complication (HCC)  Vitamin D deficiency             Physical Exam    Airway  Mallampati: III  TM Distance: 4 - 6 cm  Neck ROM: short neck   Mouth opening: Normal     Cardiovascular    Rhythm: regular  Rate: abnormal         Dental    Dentition: Edentulous     Pulmonary        Wheezes:bilateral    Prolonged expiration     Abdominal  GI exam deferred       Other Findings            Anesthetic Plan    ASA: 3            Induction: Intravenous  Anesthetic plan and risks discussed with: Patient

## 2020-03-12 NOTE — PERIOP NOTES
Pre-Op Summary    Pt arrived via car with family/friend and is oriented to time, place, person and situation. Patient with steady gait with none assistive devices. Visit Vitals  /74 (BP 1 Location: Left arm, BP Patient Position: Sitting)   Pulse 72   Temp 97 °F (36.1 °C)   Resp 18   Ht 5' 5\" (1.651 m)   Wt 80.4 kg (177 lb 3.2 oz)   SpO2 96%   BMI 29.49 kg/m²       Peripheral IV located on Left hand . Patients belongings are located with wife. Patient's point of contact is Belia Kayser and their contact number is: [phone]. They will be in the waiting room. They are able to receive medication information. They will be their ride home.

## 2020-09-29 PROBLEM — Z87.438 HISTORY OF EPIDIDYMITIS: Status: ACTIVE | Noted: 2020-09-29

## 2020-09-29 PROBLEM — Z87.898 HISTORY OF URINARY RETENTION: Status: ACTIVE | Noted: 2020-09-29

## 2020-09-29 PROBLEM — N39.0 URINARY TRACT INFECTION WITHOUT HEMATURIA: Status: RESOLVED | Noted: 2019-10-28 | Resolved: 2020-09-29

## 2020-09-29 PROBLEM — N13.30 HYDRONEPHROSIS OF RIGHT KIDNEY: Status: RESOLVED | Noted: 2017-05-03 | Resolved: 2020-09-29

## 2020-09-29 PROBLEM — R33.9 URINE RETENTION: Status: RESOLVED | Noted: 2019-10-28 | Resolved: 2020-09-29

## 2020-09-29 PROBLEM — Z97.8 FOLEY CATHETER IN PLACE: Status: RESOLVED | Noted: 2019-10-28 | Resolved: 2020-09-29

## (undated) DEVICE — TUBING IRRIG L77IN DIA0.241IN L BOR FOR CYSTO W/ NVENT

## (undated) DEVICE — STATLOCKTM STABILIZATION DEVICES (PICC PLUS, CRESCENT FOAM PAD, FIXED POST RETAINER): Brand: STATLOCK

## (undated) DEVICE — SOLUTION IRRIG 3000ML 0.9% SOD CHL FLX CONT 0797208] ICU MEDICAL INC]

## (undated) DEVICE — CATHETER URETH 22FR 30CC BLLN F 3 W SPEC M RND TIP TWO

## (undated) DEVICE — SLEEVE COMPR L THGH LEN WARP

## (undated) DEVICE — BAG DRNGE NONSTERILE W/ SUCT HOSE CYSTO/UROLOGICAL FOR GE

## (undated) DEVICE — STERILE POLYISOPRENE POWDER-FREE SURGICAL GLOVES: Brand: PROTEXIS

## (undated) DEVICE — TRAY CATH 16FR BLLN 5CC DRNGE BG 2000ML SIL F ANTIREFLX

## (undated) DEVICE — SPONGE GZ W4XL4IN COT 12 PLY TYP VII WVN C FLD DSGN

## (undated) DEVICE — PLUG CATH CAP URETH FOL STRL --

## (undated) DEVICE — FOUR LEAD ARTHROSCOPIC IRRIGATION SET

## (undated) DEVICE — Device

## (undated) DEVICE — SOLUTION IRRIG 1000ML H2O STRL BLT

## (undated) DEVICE — SOLUTION IV 1000ML 0.9% SOD CHL

## (undated) DEVICE — SYRINGE,TOOMEY,IRRIGATION,70CC,STERILE: Brand: MEDLINE

## (undated) DEVICE — CATH URETH FOL 3W MED 22FRX30 --